# Patient Record
(demographics unavailable — no encounter records)

---

## 2025-04-25 NOTE — PLAN
[FreeTextEntry1] : Info sheets in hand for healthy diet/low glycemic foods BV discussed Will f/u and proceed accordingly

## 2025-04-25 NOTE — PHYSICAL EXAM
[MA] : MA [Labia Majora] : normal [Labia Minora] : normal [Discharge] : a  ~M vaginal discharge was present [White] : white [No Bleeding] : There was no active vaginal bleeding [Normal] : normal [FreeTextEntry2] : Jackie

## 2025-04-25 NOTE — HISTORY OF PRESENT ILLNESS
[FreeTextEntry1] : This 36 yo presents c/o 5 days vulvo-vaginal itching and irritation without vaginal odor, slight discharge, self- treated 2 days ago with Diflucan with some relief; she has sought care over the last year at [facility] at least 5 times and was diagnosed with either a UTI or yeast candida. Has tried to limit sugar laden diet, reports borderline diabetes labs.